# Patient Record
Sex: MALE | Race: WHITE
[De-identification: names, ages, dates, MRNs, and addresses within clinical notes are randomized per-mention and may not be internally consistent; named-entity substitution may affect disease eponyms.]

---

## 2018-07-10 ENCOUNTER — HOSPITAL ENCOUNTER (OUTPATIENT)
Dept: HOSPITAL 89 - RAD | Age: 23
End: 2018-07-10
Attending: PHYSICIAN ASSISTANT

## 2018-07-10 DIAGNOSIS — Z02.9: Primary | ICD-10-CM

## 2018-07-10 PROCEDURE — 71045 X-RAY EXAM CHEST 1 VIEW: CPT

## 2018-07-10 NOTE — RADIOLOGY IMAGING REPORT
FACILITY: [facility] [place] 

 

PATIENT NAME: Raji Schwab

: 1995

MR: [id number]

V: [id number]

EXAM DATE: 

ORDERING PHYSICIAN: RUFINA WILSON

TECHNOLOGIST: 

 

Location: [facility]

Patient: Raji Schwab

: 1995

MRN: [id number]

Visit/Account:[id number]

Date of Sevice:  7/10/2018

 

ACCESSION #: [id number]

 

CHEST SINGLE AP

 

HISTORY: Work physical.

 

COMPARISON: None

 

FINDINGS:

 

Cardiomediastinal contours: The heart size is normal.

Lungs and pleura: There is no finding of an infiltrate, lymphadenopathy or pleural effusion.

Bones/soft tissues: There are no findings of a fracture.

 

 

IMPRESSION:

Normal chest x-ray without findings of acute disease.

 

 

Report Dictated By: Dong Rudd MD at 7/10/2018 10:39 AM

 

Report E-Signed By: Dong Rudd MD  at 7/10/2018 10:40 AM

 

WSN:[id number]